# Patient Record
Sex: MALE | Race: WHITE
[De-identification: names, ages, dates, MRNs, and addresses within clinical notes are randomized per-mention and may not be internally consistent; named-entity substitution may affect disease eponyms.]

---

## 2017-03-01 ENCOUNTER — RX ONLY (RX ONLY)
Age: 47
End: 2017-03-01

## 2017-03-01 ENCOUNTER — DOCTOR'S OFFICE (OUTPATIENT)
Dept: URBAN - METROPOLITAN AREA CLINIC 125 | Facility: CLINIC | Age: 47
Setting detail: OPHTHALMOLOGY
End: 2017-03-01
Payer: OTHER GOVERNMENT

## 2017-03-01 DIAGNOSIS — H11.31: ICD-10-CM

## 2017-03-01 DIAGNOSIS — Z94.7: ICD-10-CM

## 2017-03-01 PROCEDURE — 92002 INTRM OPH EXAM NEW PATIENT: CPT | Performed by: OPTOMETRIST

## 2017-03-01 ASSESSMENT — REFRACTION_MANIFEST
OU_VA: 20/
OD_VA2: 20/
OD_VA2: 20/
OD_VA3: 20/
OS_VA1: 20/
OD_VA1: 20/
OS_VA2: 20/
OS_VA3: 20/
OS_VA1: 20/
OS_VA3: 20/
OS_VA2: 20/
OS_VA3: 20/
OD_VA1: 20/
OS_VA2: 20/
OD_VA2: 20/
OD_VA3: 20/
OU_VA: 20/
OD_VA3: 20/
OD_VA1: 20/
OS_VA1: 20/
OU_VA: 20/

## 2017-03-01 ASSESSMENT — REFRACTION_CURRENTRX
OD_OVR_VA: 20/
OS_OVR_VA: 20/
OS_OVR_VA: 20/
OD_OVR_VA: 20/
OS_OVR_VA: 20/
OD_OVR_VA: 20/

## 2017-03-01 ASSESSMENT — CONFRONTATIONAL VISUAL FIELD TEST (CVF)
OD_FINDINGS: FULL
OS_FINDINGS: FULL

## 2017-03-01 ASSESSMENT — VISUAL ACUITY
OD_BCVA: 20/50-2
OS_BCVA: 20/60-2

## 2019-03-27 ENCOUNTER — WALK IN (OUTPATIENT)
Dept: URGENT CARE | Age: 49
End: 2019-03-27

## 2019-03-27 VITALS
DIASTOLIC BLOOD PRESSURE: 70 MMHG | RESPIRATION RATE: 16 BRPM | TEMPERATURE: 98.5 F | SYSTOLIC BLOOD PRESSURE: 118 MMHG | BODY MASS INDEX: 23.4 KG/M2 | WEIGHT: 158 LBS | OXYGEN SATURATION: 97 % | HEIGHT: 69 IN | HEART RATE: 66 BPM

## 2019-03-27 DIAGNOSIS — K13.0 CELLULITIS OF LIP: Primary | ICD-10-CM

## 2019-03-27 PROCEDURE — 99202 OFFICE O/P NEW SF 15 MIN: CPT | Performed by: NURSE PRACTITIONER

## 2019-03-27 RX ORDER — AMOXICILLIN AND CLAVULANATE POTASSIUM 875; 125 MG/1; MG/1
1 TABLET, FILM COATED ORAL 2 TIMES DAILY
Qty: 20 TABLET | Refills: 0 | Status: SHIPPED | OUTPATIENT
Start: 2019-03-27

## 2019-03-27 ASSESSMENT — ENCOUNTER SYMPTOMS: FACIAL SWELLING: 1

## 2020-08-25 ENCOUNTER — HOSPITAL ENCOUNTER (EMERGENCY)
Dept: HOSPITAL 15 - ER | Age: 50
Discharge: HOME | End: 2020-08-25
Payer: SELF-PAY

## 2020-08-25 VITALS — DIASTOLIC BLOOD PRESSURE: 81 MMHG | SYSTOLIC BLOOD PRESSURE: 132 MMHG

## 2020-08-25 VITALS — WEIGHT: 137.06 LBS | HEIGHT: 72 IN | BODY MASS INDEX: 18.56 KG/M2

## 2020-08-25 DIAGNOSIS — M25.561: Primary | ICD-10-CM

## 2020-08-25 DIAGNOSIS — G89.29: ICD-10-CM

## 2020-08-25 DIAGNOSIS — F17.210: ICD-10-CM

## 2020-08-25 PROCEDURE — 73562 X-RAY EXAM OF KNEE 3: CPT

## 2024-08-27 ENCOUNTER — HOSPITAL ENCOUNTER (INPATIENT)
Facility: CLINIC | Age: 54
LOS: 1 days | Discharge: ANOTHER HOSPITAL | End: 2024-08-28
Attending: HOSPITALIST | Admitting: STUDENT IN AN ORGANIZED HEALTH CARE EDUCATION/TRAINING PROGRAM
Payer: COMMERCIAL

## 2024-08-27 PROBLEM — K85.90 PANCREATITIS: Status: ACTIVE | Noted: 2024-08-27

## 2024-08-27 PROCEDURE — 250N000011 HC RX IP 250 OP 636: Performed by: STUDENT IN AN ORGANIZED HEALTH CARE EDUCATION/TRAINING PROGRAM

## 2024-08-27 PROCEDURE — 83605 ASSAY OF LACTIC ACID: CPT | Performed by: STUDENT IN AN ORGANIZED HEALTH CARE EDUCATION/TRAINING PROGRAM

## 2024-08-27 PROCEDURE — 99223 1ST HOSP IP/OBS HIGH 75: CPT | Performed by: STUDENT IN AN ORGANIZED HEALTH CARE EDUCATION/TRAINING PROGRAM

## 2024-08-27 PROCEDURE — 36415 COLL VENOUS BLD VENIPUNCTURE: CPT | Performed by: STUDENT IN AN ORGANIZED HEALTH CARE EDUCATION/TRAINING PROGRAM

## 2024-08-27 PROCEDURE — 84484 ASSAY OF TROPONIN QUANT: CPT | Performed by: STUDENT IN AN ORGANIZED HEALTH CARE EDUCATION/TRAINING PROGRAM

## 2024-08-27 PROCEDURE — 258N000003 HC RX IP 258 OP 636: Performed by: STUDENT IN AN ORGANIZED HEALTH CARE EDUCATION/TRAINING PROGRAM

## 2024-08-27 PROCEDURE — 120N000013 HC R&B IMCU

## 2024-08-27 RX ORDER — AMOXICILLIN 250 MG
2 CAPSULE ORAL 2 TIMES DAILY
Status: DISCONTINUED | OUTPATIENT
Start: 2024-08-27 | End: 2024-08-28 | Stop reason: HOSPADM

## 2024-08-27 RX ORDER — HYDRALAZINE HYDROCHLORIDE 10 MG/1
10 TABLET, FILM COATED ORAL EVERY 4 HOURS PRN
Status: DISCONTINUED | OUTPATIENT
Start: 2024-08-27 | End: 2024-08-28 | Stop reason: HOSPADM

## 2024-08-27 RX ORDER — ACETAMINOPHEN 325 MG/1
975 TABLET ORAL 3 TIMES DAILY
Status: DISCONTINUED | OUTPATIENT
Start: 2024-08-27 | End: 2024-08-28 | Stop reason: HOSPADM

## 2024-08-27 RX ORDER — SODIUM CHLORIDE 9 MG/ML
INJECTION, SOLUTION INTRAVENOUS CONTINUOUS
Status: DISCONTINUED | OUTPATIENT
Start: 2024-08-27 | End: 2024-08-28 | Stop reason: HOSPADM

## 2024-08-27 RX ORDER — NALOXONE HYDROCHLORIDE 0.4 MG/ML
0.4 INJECTION, SOLUTION INTRAMUSCULAR; INTRAVENOUS; SUBCUTANEOUS
Status: DISCONTINUED | OUTPATIENT
Start: 2024-08-27 | End: 2024-08-28 | Stop reason: HOSPADM

## 2024-08-27 RX ORDER — CELECOXIB 200 MG/1
200 CAPSULE ORAL 2 TIMES DAILY
Status: DISCONTINUED | OUTPATIENT
Start: 2024-08-27 | End: 2024-08-28 | Stop reason: HOSPADM

## 2024-08-27 RX ORDER — ONDANSETRON 4 MG/1
4 TABLET, ORALLY DISINTEGRATING ORAL EVERY 6 HOURS PRN
Status: DISCONTINUED | OUTPATIENT
Start: 2024-08-27 | End: 2024-08-28 | Stop reason: HOSPADM

## 2024-08-27 RX ORDER — AMOXICILLIN 250 MG
2 CAPSULE ORAL 2 TIMES DAILY PRN
Status: DISCONTINUED | OUTPATIENT
Start: 2024-08-27 | End: 2024-08-28 | Stop reason: HOSPADM

## 2024-08-27 RX ORDER — NALOXONE HYDROCHLORIDE 0.4 MG/ML
0.2 INJECTION, SOLUTION INTRAMUSCULAR; INTRAVENOUS; SUBCUTANEOUS
Status: DISCONTINUED | OUTPATIENT
Start: 2024-08-27 | End: 2024-08-28 | Stop reason: HOSPADM

## 2024-08-27 RX ORDER — AMOXICILLIN 250 MG
1 CAPSULE ORAL 2 TIMES DAILY
Status: DISCONTINUED | OUTPATIENT
Start: 2024-08-27 | End: 2024-08-28 | Stop reason: HOSPADM

## 2024-08-27 RX ORDER — CALCIUM CARBONATE 500 MG/1
1000 TABLET, CHEWABLE ORAL 4 TIMES DAILY PRN
Status: DISCONTINUED | OUTPATIENT
Start: 2024-08-27 | End: 2024-08-28 | Stop reason: HOSPADM

## 2024-08-27 RX ORDER — PROCHLORPERAZINE MALEATE 10 MG
10 TABLET ORAL EVERY 6 HOURS PRN
Status: DISCONTINUED | OUTPATIENT
Start: 2024-08-27 | End: 2024-08-28 | Stop reason: HOSPADM

## 2024-08-27 RX ORDER — POLYETHYLENE GLYCOL 3350 17 G/17G
17 POWDER, FOR SOLUTION ORAL DAILY
Status: DISCONTINUED | OUTPATIENT
Start: 2024-08-28 | End: 2024-08-28 | Stop reason: HOSPADM

## 2024-08-27 RX ORDER — LIDOCAINE 40 MG/G
CREAM TOPICAL
Status: DISCONTINUED | OUTPATIENT
Start: 2024-08-27 | End: 2024-08-28 | Stop reason: HOSPADM

## 2024-08-27 RX ORDER — NICOTINE 21 MG/24HR
1 PATCH, TRANSDERMAL 24 HOURS TRANSDERMAL AT BEDTIME
Status: DISCONTINUED | OUTPATIENT
Start: 2024-08-28 | End: 2024-08-28 | Stop reason: HOSPADM

## 2024-08-27 RX ORDER — PROCHLORPERAZINE 25 MG
25 SUPPOSITORY, RECTAL RECTAL EVERY 12 HOURS PRN
Status: DISCONTINUED | OUTPATIENT
Start: 2024-08-27 | End: 2024-08-28 | Stop reason: HOSPADM

## 2024-08-27 RX ORDER — SODIUM CHLORIDE 9 MG/ML
INJECTION, SOLUTION INTRAVENOUS CONTINUOUS
Status: DISCONTINUED | OUTPATIENT
Start: 2024-08-27 | End: 2024-08-28

## 2024-08-27 RX ORDER — LIDOCAINE 40 MG/G
CREAM TOPICAL
Status: DISCONTINUED | OUTPATIENT
Start: 2024-08-27 | End: 2024-08-27

## 2024-08-27 RX ORDER — HYDRALAZINE HYDROCHLORIDE 20 MG/ML
10 INJECTION INTRAMUSCULAR; INTRAVENOUS EVERY 4 HOURS PRN
Status: DISCONTINUED | OUTPATIENT
Start: 2024-08-27 | End: 2024-08-28 | Stop reason: HOSPADM

## 2024-08-27 RX ORDER — ONDANSETRON 2 MG/ML
4 INJECTION INTRAMUSCULAR; INTRAVENOUS EVERY 6 HOURS PRN
Status: DISCONTINUED | OUTPATIENT
Start: 2024-08-27 | End: 2024-08-28 | Stop reason: HOSPADM

## 2024-08-27 RX ORDER — AMOXICILLIN 250 MG
1 CAPSULE ORAL 2 TIMES DAILY PRN
Status: DISCONTINUED | OUTPATIENT
Start: 2024-08-27 | End: 2024-08-28 | Stop reason: HOSPADM

## 2024-08-27 RX ADMIN — Medication: at 23:56

## 2024-08-27 RX ADMIN — SODIUM CHLORIDE: 9 INJECTION, SOLUTION INTRAVENOUS at 23:38

## 2024-08-27 ASSESSMENT — COLUMBIA-SUICIDE SEVERITY RATING SCALE - C-SSRS
6. HAVE YOU EVER DONE ANYTHING, STARTED TO DO ANYTHING, OR PREPARED TO DO ANYTHING TO END YOUR LIFE?: NO
1. IN THE PAST MONTH, HAVE YOU WISHED YOU WERE DEAD OR WISHED YOU COULD GO TO SLEEP AND NOT WAKE UP?: NO
2. HAVE YOU ACTUALLY HAD ANY THOUGHTS OF KILLING YOURSELF IN THE PAST MONTH?: NO
6. HAVE YOU EVER DONE ANYTHING, STARTED TO DO ANYTHING, OR PREPARED TO DO ANYTHING TO END YOUR LIFE?: NO
2. HAVE YOU ACTUALLY HAD ANY THOUGHTS OF KILLING YOURSELF SINCE LAST CONTACT?: NO

## 2024-08-27 ASSESSMENT — ACTIVITIES OF DAILY LIVING (ADL)
ADLS_ACUITY_SCORE: 20
WEAR_GLASSES_OR_BLIND: YES
CHANGE_IN_FUNCTIONAL_STATUS_SINCE_ONSET_OF_CURRENT_ILLNESS/INJURY: NO

## 2024-08-28 ENCOUNTER — HOSPITAL ENCOUNTER (OUTPATIENT)
Age: 54
End: 2024-08-28
Attending: STUDENT IN AN ORGANIZED HEALTH CARE EDUCATION/TRAINING PROGRAM | Admitting: STUDENT IN AN ORGANIZED HEALTH CARE EDUCATION/TRAINING PROGRAM
Payer: COMMERCIAL

## 2024-08-28 VITALS
RESPIRATION RATE: 12 BRPM | HEART RATE: 69 BPM | TEMPERATURE: 97.8 F | BODY MASS INDEX: 23.05 KG/M2 | OXYGEN SATURATION: 94 % | WEIGHT: 152.12 LBS | DIASTOLIC BLOOD PRESSURE: 106 MMHG | SYSTOLIC BLOOD PRESSURE: 170 MMHG | HEIGHT: 68 IN

## 2024-08-28 LAB
ALBUMIN SERPL BCG-MCNC: 4 G/DL (ref 3.5–5.2)
ALP SERPL-CCNC: 96 U/L (ref 40–150)
ALT SERPL W P-5'-P-CCNC: 12 U/L (ref 0–70)
ANION GAP SERPL CALCULATED.3IONS-SCNC: 13 MMOL/L (ref 7–15)
AST SERPL W P-5'-P-CCNC: 15 U/L (ref 0–45)
ATRIAL RATE - MUSE: 73 BPM
BILIRUB SERPL-MCNC: 0.3 MG/DL
BUN SERPL-MCNC: 15.2 MG/DL (ref 6–20)
CALCIUM SERPL-MCNC: 9.5 MG/DL (ref 8.8–10.4)
CHLORIDE SERPL-SCNC: 100 MMOL/L (ref 98–107)
CREAT SERPL-MCNC: 0.98 MG/DL (ref 0.67–1.17)
DIASTOLIC BLOOD PRESSURE - MUSE: NORMAL MMHG
EGFRCR SERPLBLD CKD-EPI 2021: >90 ML/MIN/1.73M2
ERYTHROCYTE [DISTWIDTH] IN BLOOD BY AUTOMATED COUNT: 12.1 % (ref 10–15)
GLUCOSE BLDC GLUCOMTR-MCNC: 110 MG/DL (ref 70–99)
GLUCOSE SERPL-MCNC: 100 MG/DL (ref 70–99)
HCO3 SERPL-SCNC: 25 MMOL/L (ref 22–29)
HCT VFR BLD AUTO: 39.8 % (ref 40–53)
HGB BLD-MCNC: 13.2 G/DL (ref 13.3–17.7)
INTERPRETATION ECG - MUSE: NORMAL
LACTATE SERPL-SCNC: 0.8 MMOL/L (ref 0.7–2)
LACTATE SERPL-SCNC: 1.5 MMOL/L (ref 0.7–2)
MCH RBC QN AUTO: 30.2 PG (ref 26.5–33)
MCHC RBC AUTO-ENTMCNC: 33.2 G/DL (ref 31.5–36.5)
MCV RBC AUTO: 91 FL (ref 78–100)
P AXIS - MUSE: 117 DEGREES
PLATELET # BLD AUTO: 374 10E3/UL (ref 150–450)
POTASSIUM SERPL-SCNC: 4.1 MMOL/L (ref 3.4–5.3)
PR INTERVAL - MUSE: 146 MS
PROT SERPL-MCNC: 7.1 G/DL (ref 6.4–8.3)
PTH-INTACT SERPL-MCNC: 41 PG/ML (ref 15–65)
QRS DURATION - MUSE: 86 MS
QT - MUSE: 398 MS
QTC - MUSE: 438 MS
R AXIS - MUSE: 89 DEGREES
RBC # BLD AUTO: 4.37 10E6/UL (ref 4.4–5.9)
SODIUM SERPL-SCNC: 138 MMOL/L (ref 135–145)
SYSTOLIC BLOOD PRESSURE - MUSE: NORMAL MMHG
T AXIS - MUSE: 161 DEGREES
TROPONIN T SERPL HS-MCNC: 8 NG/L
VENTRICULAR RATE- MUSE: 73 BPM
WBC # BLD AUTO: 14.2 10E3/UL (ref 4–11)

## 2024-08-28 PROCEDURE — 36415 COLL VENOUS BLD VENIPUNCTURE: CPT | Performed by: STUDENT IN AN ORGANIZED HEALTH CARE EDUCATION/TRAINING PROGRAM

## 2024-08-28 PROCEDURE — 83605 ASSAY OF LACTIC ACID: CPT | Performed by: STUDENT IN AN ORGANIZED HEALTH CARE EDUCATION/TRAINING PROGRAM

## 2024-08-28 PROCEDURE — 250N000013 HC RX MED GY IP 250 OP 250 PS 637: Performed by: INTERNAL MEDICINE

## 2024-08-28 PROCEDURE — 250N000011 HC RX IP 250 OP 636: Performed by: STUDENT IN AN ORGANIZED HEALTH CARE EDUCATION/TRAINING PROGRAM

## 2024-08-28 PROCEDURE — 93005 ELECTROCARDIOGRAM TRACING: CPT

## 2024-08-28 PROCEDURE — 258N000003 HC RX IP 258 OP 636: Performed by: STUDENT IN AN ORGANIZED HEALTH CARE EDUCATION/TRAINING PROGRAM

## 2024-08-28 PROCEDURE — 93010 ELECTROCARDIOGRAM REPORT: CPT | Performed by: INTERNAL MEDICINE

## 2024-08-28 PROCEDURE — 85027 COMPLETE CBC AUTOMATED: CPT | Performed by: STUDENT IN AN ORGANIZED HEALTH CARE EDUCATION/TRAINING PROGRAM

## 2024-08-28 PROCEDURE — 83970 ASSAY OF PARATHORMONE: CPT | Performed by: STUDENT IN AN ORGANIZED HEALTH CARE EDUCATION/TRAINING PROGRAM

## 2024-08-28 PROCEDURE — 250N000011 HC RX IP 250 OP 636: Performed by: INTERNAL MEDICINE

## 2024-08-28 PROCEDURE — 80053 COMPREHEN METABOLIC PANEL: CPT | Performed by: STUDENT IN AN ORGANIZED HEALTH CARE EDUCATION/TRAINING PROGRAM

## 2024-08-28 PROCEDURE — 999N000127 HC STATISTIC PERIPHERAL IV START W US GUIDANCE

## 2024-08-28 PROCEDURE — 99232 SBSQ HOSP IP/OBS MODERATE 35: CPT | Performed by: INTERNAL MEDICINE

## 2024-08-28 PROCEDURE — 250N000013 HC RX MED GY IP 250 OP 250 PS 637: Performed by: STUDENT IN AN ORGANIZED HEALTH CARE EDUCATION/TRAINING PROGRAM

## 2024-08-28 RX ORDER — OXYCODONE HYDROCHLORIDE 5 MG/1
10 TABLET ORAL ONCE
Status: COMPLETED | OUTPATIENT
Start: 2024-08-28 | End: 2024-08-28

## 2024-08-28 RX ADMIN — CELECOXIB 200 MG: 200 CAPSULE ORAL at 00:32

## 2024-08-28 RX ADMIN — SODIUM CHLORIDE: 9 INJECTION, SOLUTION INTRAVENOUS at 06:04

## 2024-08-28 RX ADMIN — SODIUM CHLORIDE: 9 INJECTION, SOLUTION INTRAVENOUS at 13:21

## 2024-08-28 RX ADMIN — SODIUM CHLORIDE: 9 INJECTION, SOLUTION INTRAVENOUS at 13:30

## 2024-08-28 RX ADMIN — SENNOSIDES AND DOCUSATE SODIUM 1 TABLET: 50; 8.6 TABLET ORAL at 00:32

## 2024-08-28 RX ADMIN — ONDANSETRON 4 MG: 4 TABLET, ORALLY DISINTEGRATING ORAL at 16:29

## 2024-08-28 RX ADMIN — OXYCODONE HYDROCHLORIDE 10 MG: 5 TABLET ORAL at 19:10

## 2024-08-28 RX ADMIN — ONDANSETRON 4 MG: 4 TABLET, ORALLY DISINTEGRATING ORAL at 09:11

## 2024-08-28 RX ADMIN — Medication: at 13:20

## 2024-08-28 ASSESSMENT — ACTIVITIES OF DAILY LIVING (ADL)
ADLS_ACUITY_SCORE: 24
ADLS_ACUITY_SCORE: 24
ADLS_ACUITY_SCORE: 20
ADLS_ACUITY_SCORE: 24
ADLS_ACUITY_SCORE: 20
ADLS_ACUITY_SCORE: 22
ADLS_ACUITY_SCORE: 20
ADLS_ACUITY_SCORE: 24
ADLS_ACUITY_SCORE: 24
ADLS_ACUITY_SCORE: 20
ADLS_ACUITY_SCORE: 24
ADLS_ACUITY_SCORE: 20
ADLS_ACUITY_SCORE: 24
ADLS_ACUITY_SCORE: 22
ADLS_ACUITY_SCORE: 21
ADLS_ACUITY_SCORE: 21
ADLS_ACUITY_SCORE: 24
ADLS_ACUITY_SCORE: 20
ADLS_ACUITY_SCORE: 20

## 2024-08-28 NOTE — CONSULTS
Discussed this very complicated patient with the vascular fellow.  This patient is very high risk for a bleed into his pancreatic pseudocyst.  That is not something that I think can be managed at this hospital.  He should be someplace with experience hepatobiliary surgeons in case surgery should become necessary.  Likely will be able to be managed with IR but again has high risk of decompensation.  Full consult will not be done as there is likely going to be transferred to a different facility.

## 2024-08-28 NOTE — PLAN OF CARE
Goal Outcome Evaluation:  Patient Name: Suzie  MRN: [id number]  Date of Admission: 8/27/2024  Reason for Admission: Pancreatitis  Level of Care: [facility] [place]    Vitals:   BP Readings from Last 1 Encounters:   08/28/24 (!) 157/102     Pulse Readings from Last 1 Encounters:   08/28/24 62     Wt Readings from Last 1 Encounters:   No data found for Wt     Ht Readings from Last 1 Encounters:   No data found for Ht     There is no height or weight on file to calculate BMI.  Temp Readings from Last 1 Encounters:   08/28/24 98.4  F (36.9  C) (Oral)       Pain: Pain goal 0 Pain Rating pain bet 2-6 Effective pain medication/regimen. PCA pump     CV Surgery Patient: No    Assessment    Resp: VSS on RA.except HTN. Ls clr  Telemetry: NSR  Neuro: A/Ox4.CMS intact.   GI/: voiding w/o difficulty. BS+,flatus+. Denies n/v. NPO  Skin/Wounds: intact.   Lines/Drains: PIV x1  infusing.   Activity: up SBA.  Sleep: Fair    Aggression Stop Light: Green          Patient Care Plan: NPO. IR,  Vascular sugary,and GI consult.

## 2024-08-28 NOTE — CARE PLAN
Transfer Type: [facility]  Transfer Triage Note    Date of call: 08/28/24  Time of call: 11:19 AM    Current Patient Location:  [facility]  Current Level of Care: [facility] [place]    Vitals: Temp: 97.6  F (36.4  C) Temp src: Oral BP: (!) 160/97 Pulse: 57   Resp: 11 SpO2: 96 %      O2 Device: None (Room air) at    Diagnosis: Necrotizing pancreatitis, Celiac artery vasculitis  Reason for requested transfer: Further diagnostic work up, management, and consultation for specialized care   Isolation Needs: None    Care everywhere has been updated and reviewed: Yes  Necessary images have been sent through PACS: Yes    If patient is transferring for specialty care or specific procedure, the specialist required has participated in the transfer call and agreed with need for transfer and anticipated timeline: No    Transfer accepted: Yes  Stability of Patient: Patient is vitally stable, with no critical labs, and will likely remain stable throughout the transfer process  Is the patient appropriate for [facility]? No, What specific [place] needs are anticipated? Hepatobiliary, panc bili, rheumatology, vascular surgery, IR  Level of Care Needed: Med Surg  Telemetry Needed:  None  Expected Time of Arrival for Transfer: greater than 24 hours  Arrival Location:  [facility]     Recommendations for Management and Stabilization: Not needed    Additional Comments:     55 yo M with initially at [place] who was admitted with pancreatitis and small area of pancreatic necrosis. 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 There is concern on imaging for celiac artery vasculitis. Locally general surgery, IR, vascular surgery, and GI have been consulted and believe beyond capabilities of [facility]. They do not believe mechanical intervention on artery dissection and AVM is warranted given risk  for collaeral damage to surrounding vasculature.    Locally there is concern for rheumatologic involvement and auto immune vasculitis causing patients presentation. In addition, patient was evaluated by local surgery team who felt beyond capabilities of their facility to manage.    Plan to accept to [facility] for multi speciality consultation for possible vasculitis with rheumatology consult, ongoing pancreatitis management with panc-bili GI, general surgery, and pseudoansuersym/AVM with vascular surgery and IR.     No beds likely available at this time and may be multiple days before transfer to [facility].     Waylon Champion MD

## 2024-08-28 NOTE — PLAN OF CARE
"Goal Outcome Evaluation:    0700-1930    Patient Name: Suzie  MRN: [id number]  Date of Admission: 8/27/2024  Reason for Admission: Necrotizing pancreatitis  Level of Care: [facility] [place]    Vitals:   BP Readings from Last 1 Encounters:   08/28/24 (!) 173/104     Pulse Readings from Last 1 Encounters:   08/28/24 75     Wt Readings from Last 1 Encounters:   08/28/24 69 kg (152 lb 1.9 oz)     Ht Readings from Last 1 Encounters:   08/28/24 1.727 m (5' 8\")     There is no height or weight on file to calculate BMI.  Temp Readings from Last 1 Encounters:   08/28/24 97.6  F (36.4  C) (Oral)       Pain: Pain goal 0 Pain Rating 6 Effective pain medication/regimen Dilaudid PCA.    CV Surgery Patient: No    Assessment    Vitals: Hypertensive otherwise VSS  Resp: RA. LS are dim.   Telemetry: SR  Neuro: A/O x4  GI/: Adequate UOP. No BM during shift, passing flatus, BS +  Skin/Wounds: Intact  Lines/Drains: R PIV infusing NS @ 125 ml/hr. L PIV PCA infusion stopped for transfer to outside facility.  Activity: SBA, ambulating to restroom  Diet: NPO except ice chips  Abnormal Labs: WBC 14.2    Aggression Stop Light: Green          Patient Care Plan: Awaiting transport to [facility], handoff given to NOLAN Edwards at 1715.    At 1920 pt left floor via EMS stretcher transferring to [facility]. Pt left floor w/ all home belongings.    "

## 2024-08-28 NOTE — CONSULTS
Received a consult regarding Vincent Byers who is a 54 year old male with a recent history of pancreatitis, abdominal pain admitted with increased abdominal pain. He had a CTA chest, abdomen, pelvis at [facility] which had a questionable pancreatic AVC, celiac pseudoaneurysm and a pancreatic fluid collection. Request for IR to review and possibly treat was made.     Reviewed imaging and chart with IR Dr Tello and Dr Oswald.   -The pancreatic fluid collection is too deep and small. Would not recommend treating with a percutaneous drain.   -The pancreatic AVM is actually a vascular malformation around the head and body of the pancreas, possible caused by the pancreatic inflammation.   -There is also SMA wall and R renal artery wall thickening which appears to be a vasculitis  -The area that appears to be a pseudoaneurysm is actually a dissection flap in the celiac trunk and IR recommends just watching for now.     Recommend a work up for vasculitis, checking inflammatory markers and/or a vascular medicine or a rheumatology consult which understood is difficult to get in the hospital.     No IR intervention at this time. Possibly GI recommendations.      Discussed above with Hospitalist Dr Chavez today.     Total time: 25 minutes     Thanks, [facility] Interventional Radiology CNP ([phone]) (phone [phone])

## 2024-08-28 NOTE — H&P
"Assessment / Plan    54M no PMH presenting w/ progressive abdominal pain after recent hospitalisation for pancreatitis.     Acute noninfected necrotizing Pancreaitits  Hypercalcemia  Lactic Acidsosis -  Resolved  ``Hx: Patient presenting with Upper abdominal pain for atleast 2 weeks, that is progressive&constant in nature, associated w/ nausea/vomiting/bloating, radiates to the L chest making it difficult to take deep breaths, and aggrevated w/ food intake. No fevers/chills, does endorse constipation. No ETOH use, no Hx gallstones.  Patient reports visiting [facility] ER twice, on 8/14 and 8/15 for the pain. Patient presented to [facility] ER 8/18 and was admitted.  Underwent MRCP which identified pancreatitis with a suspected small area of developing necrosis w/o evidence of gallstones. TG were negative,  IgG negative. GI recommended Outpatient EUS for the pancreatic attentuation. Since his discharge, pain progressed in nature, and today was most severe after consuming prune juice.   ``Vitals/Exam: SBP 180s  ``EKG: NSR w/ diffuse q waves and high lateral TWIs  ``Labs:Amylase 390s. CRP 6.6, Ddimer 0.8, Ca 11.4, WBC 13.2, AST 29, negative Trop  ``Imaging: CT negative for PE. \"Interval progression in seize of the low-attenuation fluid collection seen within the pancreatic body, with new mild peripancreatic inflammatory changes. Likely related to acute necrotizing pancreatitis w/ progression of the acute necrotic collection\"  ``ER Course: percocet, dilaudid, 1L fluids  --[facility] consult  --NPO (consider liquid diet in AM), High rate IVF  --standing tylenol, celebrex  --PCA pump w/ Bowel regimen  --PTH level  --trend lactate, wbc  --IR consult  --Repeat EKG/Trop since persistant radiation of pain to L chest    ?Celiac artery pseudoaneurysm  ?Pancreatic AVM  ``CT read \"interval development of an abnormal appearance of the proximal celiac artery, with a contour abnormality seen along the right lateral wall. There are mild " "associated surrounding inflammatory changes in this region. Given the presence of pancreatitis, could be related to celiac artery pseudoaneurysm. Also an abnormal collection of vessels along the anterior margin of the pancreatic neck and body which could be an AVM\"  --IR consult  --Vascular surgery consult  --Given increased of DVTs given patient's acute sickness, it maybe warranted to started ppx lovenox, however would defer to IR    HTN  ``likely pain-induced  --prn hydralazine    Nicotine Use  ``half a pack for 30yrs=15 pack yrs  --outpatient Low dose CT yearly  --nicotine protocol    Supportive Care  DVT ppx: SCDs  Diet: NPO  Pain Control: tylenol, PCA  Upper GI ppx: zofran  Lower GI ppx: senna  Lines/Catheters: IV  TTE/Dopplers: None  Contact/Seizure/Fall/Delerium Precautions: None  PT/OT/Social/Wound/Palliative Consults: None  Code Status: Full Code     History of Present Illness  Patient presenting with Upper abdominal pain for atleast 2 weeks, that is progressive&constant in nature, associated w/ nausea/vomiting/bloating, radiates to the L chest making it difficult to take deep breaths, and aggrevated w/ food intake. No fevers/chills, does endorse constipation. No ETOH use, no Hx gallstones.  Patient reports visiting [facility] ER twice, on 8/14 and 8/15 for the pain. Patient presented to [facility] ER 8/18 and was admitted.  Underwent MRCP which identified pancreatitis with a suspected small area of developing necrosis w/o evidence of gallstones. TG were negative,  IgG negative. GI recommended Outpatient EUS for the pancreatic attentuation. Since his discharge, pain progressed in nature, and today was most severe after consuming prune juice.     ROS: 10 point ROS neg other than the symptoms noted above in the HPI.     Objective History  BP (!) 178/109   Pulse 62   Resp 12   SpO2 94%     Constitutional: Alert and oriented to person, place and time; no apparent distress.   HEENT: Normocephalic, no scleral " icterus  Abdomen: soft, no distention/guarding, Epigastric and LUQ tenderness  Lungs: lungs clear to auscultation bilaterally  Heart: Regular s1s2, no evidence of murmurs  Neuro:No focal strength/sensation deficits  Skin/Extremities: No obvious signs of skin breakdown, no LE edema  Psychiatric: appropriate affect, insight and judgment  Back: No midline tenderness, no CVA tenderness    I have personally spent 83 minutes total time today in preparing to see the patient (eg, review of tests), obtaining and/or reviewing separately obtained history, performing a medically appropriate examination and/or evaluation, counseling and educating the patient/family/caregiver, ordering medications, tests, or procedures, referring and communicating with other health care professionals, documenting clinical information in the electronic or other health record, independently interpreting results and communicating results to the patient/ family/caregiver and care coordination.

## 2024-08-28 NOTE — PHARMACY-ADMISSION MEDICATION HISTORY
Pharmacist Admission Medication History    Admission medication history is complete. The information provided in this note is only as accurate as the sources available at the time of the update.    Information Source(s): Patient, Hospital records, and CareEverywhere/SureScripts via in-person    Pertinent Information: None    Changes made to PTA medication list:  Added: None  Deleted: None  Changed: None      Medication History Completed By: Mario Martinez RPH 8/28/2024 9:20 AM    No outpatient medications have been marked as taking for the 8/27/24 encounter (Hospital Encounter).

## 2024-08-28 NOTE — DISCHARGE SUMMARY
[facility]  Hospitalist Discharge Summary      Date of Admission:  8/27/2024  Date of Discharge:  8/28/2024  Discharging Provider: Peña Chavez MD  Discharge Service: Hospitalist Service    Discharge Diagnoses   Acute noninfected necrotizing Pancreaitits  Hypercalcemia  Lactic Acidsosis -  Resolved      Celiac artery pseudoaneurysm  Pancreatic AVM      HTN      Nicotine Use        Clinically Significant Risk Factors          Follow-ups Needed After Discharge   Follow-up Appointments     Follow Up and recommended labs and tests      Follow up with hepatobiliary team at [facility]        {Additional follow-up instructions/to-do's for Hepatobiliary team at [facility]    Unresulted Labs Ordered in the Past 30 Days of this Admission       No orders found for last 31 day(s).        These results will be followed up by [facility] and hepatogiliary and IR team at [facility]    Discharge Disposition   Transferred to [facility]  Condition at discharge: Guarded    Hospital Course   54M no PMH presenting w/ progressive abdominal pain after recent hospitalisation for pancreatitis.      Acute noninfected necrotizing Pancreaitits  Hypercalcemia  Lactic Acidsosis -  Resolved  ``Hx: Patient presenting with Upper abdominal pain for atleast 2 weeks, that is progressive&constant in nature, associated w/ nausea/vomiting/bloating, radiates to the L chest making it difficult to take deep breaths, and aggrevated w/ food intake. No fevers/chills, does endorse constipation. No ETOH use, no Hx gallstones.  Patient reports visiting [facility] ER twice, on 8/14 and 8/15 for the pain. Patient presented to [facility] ER 8/18 and was admitted.  Underwent MRCP which identified pancreatitis with a suspected small area of developing necrosis w/o evidence of gallstones. TG were negative,  IgG negative. GI recommended Outpatient EUS for the pancreatic attentuation. Since his discharge, pain progressed in nature, and today was most  "severe after consuming prune juice.   ``Vitals/Exam: SBP 180s  ``EKG: NSR w/ diffuse q waves and high lateral TWIs  ``Labs:Amylase 390s. CRP 6.6, Ddimer 0.8, Ca 11.4, WBC 13.2, AST 29, negative Trop  ``Imaging: CT negative for PE. \"Interval progression in seize of the low-attenuation fluid collection seen within the pancreatic body, with new mild peripancreatic inflammatory changes. Likely related to acute necrotizing pancreatitis w/ progression of the acute necrotic collection\"  ``ER Course: percocet, dilaudid, 1L fluids  --[facility] consulted and recommendation of further management with hepatobiliary team at [facility]  --NPO (consider liquid diet in AM), High rate IVF  --standing tylenol, celebrex  --PCA pump w/ Bowel regimen  --PTH level pending  --wbc elevated, lactic acid has normalized  --IR consult at [facility], they did not recommend:    Reviewed imaging and chart with IR Dr Tello and Dr Oswald.   -The pancreatic fluid collection is too deep and small. Would not recommend treating with a percutaneous drain.   -The pancreatic AVM is actually a vascular malformation around the head and body of the pancreas, possible caused by the pancreatic inflammation.   -There is also SMA wall and R renal artery wall thickening which appears to be a vasculitis  -The area that appears to be a pseudoaneurysm is actually a dissection flap in the celiac trunk and IR recommends just watching for now.      Recommend a work up for vasculitis, checking inflammatory markers and/or a vascular medicine or a rheumatology consult which understood is difficult to get in the hospital.      No IR intervention at this time. Possibly GI recommendations.          ## Celiac artery pseudoaneurysm  ## Pancreatic AVM  ``CT read \"interval development of an abnormal appearance of the proximal celiac artery, with a contour abnormality seen along the right lateral wall. There are mild associated surrounding inflammatory changes in this region. Given the " "presence of pancreatitis, could be related to celiac artery pseudoaneurysm. Also an abnormal collection of vessels along the anterior margin of the pancreatic neck and body which could be an AVM\"  --Vascular surgery consult as follows:   Discussed this very complicated patient with the vascular fellow.  This patient is very high risk for a bleed into his pancreatic pseudocyst.  That is not something that I think can be managed at this hospital.  He should be someplace with experience hepatobiliary surgeons in case surgery should become necessary.  Likely will be able to be managed with IR but again has high risk of decompensation.  Full consult will not be done as there is likely going to be transferred to a different facility.        HTN  ``likely pain-induced  --prn hydralazine     Nicotine Use  ``half a pack for 30yrs=15 pack yrs  --outpatient Low dose CT yearly  --nicotine protocol     Supportive Care  DVT ppx: SCDs  Diet: NPO  Pain Control: tylenol, PCA  Upper GI ppx: zofran  Lower GI ppx: senna  Lines/Catheters: IV  TTE/Dopplers: None  Contact/Seizure/Fall/Delerium Precautions: None  PT/OT/Social/Wound/Palliative Consults: None  Code Status: Full Code     CTA from 8/27 at outside hospital:     CT PULMONARY ANGIOGRAM:   Pulmonary arteries: Good contrast opacification. No filling defects to suggest   an acute pulmonary embolus.   Heart and aorta: Heart size is within normal limits. No pericardial effusion.   Normal caliber of the thoracic aorta.   Lymphadenopathy: None by size criteria.   Lungs: No evidence of acute airways or airspace disease. No pleural effusion or   pneumothorax.   Bones: No acute abnormality. Postsurgical changes left shoulder.   Other: A small amount of fluid is seen in the nondistended thoracic esophagus.   This is likely related to reflux.     CT OF THE ABDOMEN AND PELVIS:   Liver: Normal size. Small focus of fatty infiltration is seen along the   falciform ligament.   Gallbladder and " "bile ducts: No calcified stones in the gallbladder or   significant bile duct dilatation.   Spleen: Normal size. Benign calcification.   Pancreas: In the body of the pancreas on axial images 43 through 52, there is   now a more well-defined low-attenuation focus (average Hounsfield units between   15 and 20) which on axial image 49 measures 1.7 x 3.9 cm. At a similar level on   the most recent examination, this measured 0.6 x 1.8 cm. No gas is seen within   this collection. There is only minimal associated peripancreatic fat stranding   in this region.   Adrenal glands: No nodule or mass.   Kidneys: Normal size and enhancement. Right renal cyst is redemonstrated.   GI tract: Small hiatal hernia. Stomach is nondistended. No abnormally dilated   small or large bowel to suggest obstruction. No evidence of acute appendicitis.   Moderate to large amount of stool is seen in the colon.   Vessels: Atherosclerotic changes. No abdominal aortic aneurysm. There is focal   dilatation of the proximal celiac axis, best seen on axial images 44 and 45,   with associated minor periarterial edema. On the arterial phase images (from the   chest CT) on axial images 94 and 95, there is irregularity of the right lateral   wall of the celiac artery and this region. This could be related to a   pseudoaneurysm. Also best noted on the arterial phase images, is an abnormal   \"tangle\" of vessels along the anterior aspect of the pancreatic neck and body.   This could be related to an AVM.   Free air and free fluid: None.   Lymphadenopathy: None by size criteria.   Pelvic contents: No appreciable urinary bladder wall thickening. Prostate size   is within normal limits.   Bones: No acute abnormality.   Other: Tiny fat-containing umbilical hernia.     IMPRESSION:   1. No CT evidence of an acute pulmonary embolus.   2.  There has been interval progression in the size of the low-attenuation fluid   collection seen within the pancreatic body, with new " mild peripancreatic   inflammatory changes. This is likely related to acute necrotizing pancreatitis   with progression of the acute necrotic collection.   3.  There has been interval development of an abnormal appearance of the   proximal celiac artery, with a contour abnormality seen along the right lateral   wall. There are mild associated surrounding inflammatory changes in this region.   Given the presence of pancreatitis, this could be related to a celiac artery   pseudoaneurysm. There is also an abnormal collection of vessels seen along the   anterior margin of the pancreatic neck and body, which could be related to an   AVM. Recommend IR consultation.   4.  Other findings as noted above.          Consultations This Hospital Stay   GASTROENTEROLOGY IP CONSULT  INTERVENTIONAL RADIOLOGY ADULT/PEDS IP CONSULT  INTERVENTIONAL RADIOLOGY ADULT/PEDS IP CONSULT  VASCULAR SURGERY IP CONSULT  SMOKING CESSATION PROGRAM IP CONSULT  VASCULAR ACCESS ADULT IP CONSULT  INTERVENTIONAL RADIOLOGY ADULT/PEDS IP CONSULT  HEMATOLOGY & ONCOLOGY IP CONSULT  SURGERY GENERAL IP CONSULT    Code Status   Full Code    Time Spent on this Encounter   I, Peña Chavez MD, personally saw the patient today and spent greater than 30 minutes discharging this patient.       Peña Chavez MD  [address] [place]  [facility] [address]  Phone: [phone]  ______________________________________________________________________    Physical Exam   Vital Signs: Temp: 97.6  F (36.4  C) Temp src: Oral BP: (!) 160/97 Pulse: 57   Resp: 11 SpO2: 96 % O2 Device: None (Room air)    Weight: 0 lbs 0 oz  General Appearance: Resting, not confused  Respiratory: CTA  Cardiovascular: RRR  GI: + epigastric tenderness  Skin: warm and dry         Primary Care Physician   No primary care provider on file.    Discharge Orders      Mantoux instructions    Give two-step Mantoux (PPD) Per Facility Policy Yes     Follow Up  and recommended labs and tests    Follow up with hepatobiliary team at [facility]     Reason for your hospital stay    You were admitted for necrotizing pancreatitis with pseudocyst, vasculitis of SMA and renal artery and AVM     Activity - Up with nursing assistance     Full Code     Diet    Follow this diet upon discharge: Current Diet:Orders Placed This Encounter      NPO per Anesthesia Guidelines for Procedure/Surgery Except for: Meds, Ice Chips       Significant Results and Procedures   Most Recent 3 CBC's:  Recent Labs   Lab Test 08/28/24  0600   WBC 14.2*   HGB 13.2*   MCV 91        Most Recent 3 BMP's:  Recent Labs   Lab Test 08/28/24  0600 08/28/24  0047     --    POTASSIUM 4.1  --    CHLORIDE 100  --    CO2 25  --    BUN 15.2  --    CR 0.98  --    ANIONGAP 13  --    DINORAH 9.5  --    * 110*     Most Recent 2 LFT's:  Recent Labs   Lab Test 08/28/24  0600   AST 15   ALT 12   ALKPHOS 96   BILITOTAL 0.3     Most Recent 3 INR's:No lab results found., No results found for this or any previous visit.    Discharge Medications   There are no discharge medications for this patient.    Allergies   No Known Allergies

## 2024-08-28 NOTE — CONSULTS
GASTROENTEROLOGY CONSULTATION      Vincent Byers  [address] [place] [address] [address]  [facility] [address]  54 year old male     Admission Date/Time: 8/27/2024  Primary Care Provider: No primary care provider on file.     We were asked to see the patient in consultation by Dr. Chavez for evaluation of necrotizing pancreatitis.    ASSESSMENT:    1.  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  Idiopathic at this point.  No evidence of gallstones, no alcohol use, triglycerides were negative, IgG testing at outside hospital negative.    2.  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  Overall, there is concern that if this were to increase in size or rupture, the surgical capabilities are not present at [facility].  - Patient is accepted at the [facility], but there may be multiple days of waiting, since they are full.  - I did review the patient with my colleagues at [facility] and they do feel that they have the surgical, IR and pancreatic/biliary capabilities to manage the patient.    RECOMMENDATIONS:  -Discussed with Dr. Chavez, he will pursue transfer to [facility], certainly if they are full, then first available transferred to [facility] or [facility] depending on which opens first.  -IV fluids and pain management per primary service  - Monitor CBC and CMP.  - N.p.o. for now, if pain improves tomorrow, will try clear liquids.  If prolonged n.p.o., then would place nasojejunal feeding tube for tube feeds.    Magen Whiteside MD   [facility]  [phone]      ________________________________________________________________________        HPI:  Vincent Byers is a 54 year old male who has a history of recent necrotizing pancreatitis.  He was seen at the [place] emergency department twice  on August 14 and August 15 for pain.  Ultimately, was admitted on August 18.  He was diagnosed with pancreatitis and a small area of developing necrosis.  There were no evidence of gallstones, no alcohol use, triglycerides negative, IgG testing negative.  At the time, patient had recommended outpatient EUS evaluation.  The patient was home for 6 days, but presented yesterday with acute increase in epigastric abdominal pain.  Is found to have progression in the necrosis and celiac artery pseudoaneurysm, along with pancreas AVM.  Thus he was transferred for further care.    The patient reports that yesterday his abdominal pain was 15 out of 10.  Currently at 6 out of 10 in the epigastric region.  He did have nausea and vomiting yesterday.  There is no melena or medic easier.  No fevers or weight loss.  He is not on any blood thinners.  He was taking Excedrin at home for pain management.     ROS: A comprehensive ten point review of systems was negative aside from those in mentioned in the HPI.      PAST MEDICAL HISTORY:  Recent necrotizing pancreatitis at [facility]  PAST SURGICAL HISTORY:  No past surgical history on file.  SOCIAL HISTORY:  No alcohol use  FAMILY HISTORY:  No family history of pancreatitis  ALLERGIES: No Known Allergies  MEDICATIONS:   Prior to Admission medications    Not on File     PHYSICAL EXAM:   BP (!) 160/97 (BP Location: Right arm, Patient Position: Semi-Mohamud's, Cuff Size: Adult Regular)   Pulse 57   Temp 97.6  F (36.4  C) (Oral)   Resp 11   SpO2 96%    GEN: Alert, NAD.  STAS: AT, anicteric, OP without erythema, exudate, or ulcers.    LYMPH: No LAD noted.  HRT: RRR, no MAINOR  LUNGS: CTA  ABD: +BS, moderate epi tender, non-distended, no rebound or guarding.  SKIN: No rash, jaundice   NEURO: MS intact       ADDITIONAL DATA:   I reviewed the patient's new clinical lab test results.   Recent Labs   Lab Test 08/28/24  0600   WBC 14.2*   HGB 13.2*   MCV 91        Recent Labs   Lab  Test 08/28/24  0600 08/28/24  0047     --    POTASSIUM 4.1  --    CHLORIDE 100  --    CO2 25  --    BUN 15.2  --    CR 0.98  --    ANIONGAP 13  --    DINORAH 9.5  --    * 110*     Recent Labs   Lab Test 08/28/24  0600   ALBUMIN 4.0   BILITOTAL 0.3   ALT 12   AST 15        I reviewed the patient's new imaging results.     CT abdomen and pelvis (08/18/24) acquired with 100 mL of Omnipaque 350 IV contrast.     COMPARISON:   CT abdomen and pelvis with contrast 08/14/2024.   CT abdomen and pelvis with contrast 10/26/2022.     FINDINGS:     Lower chest: Lung bases are clear. No pleural or pericardial effusions. Small   hiatal hernia.     Liver: Focal fatty change adjacent to the falciform ligament.     Spleen: Unremarkable.     Pancreas: Ill-defined low attenuation in the body of the pancreas axial image   37 of series 2 appears increased. No distal pancreatic atrophy or pancreatic   ductal dilatation demonstrated. There is subtle peripancreatic stranding.     Gallbladder and bile ducts: No calcified stones or biliary ductal dilatation.     Kidneys: Stable 3.2 cm right renal cyst. No urolithiasis or hydronephrosis.     Adrenal glands: Unremarkable.     GI tract: Colonic diverticulosis without evidence of acute diverticulitis. No   bowel obstruction or focal inflammatory change elsewhere. Normal appendix. Fecal   loading of the proximal colon. No free air or free fluid.     Lymph nodes: No pathologic lymphadenopathy.     Vascular structures: Unremarkable.     Pelvic Organs: Unremarkable.     Bones: No acute or suspicious osseous abnormality.        IMPRESSION:     1. Ill-defined low attenuation in the body of the pancreas has increased and may   represent progression of pancreatitis in the appropriate clinical setting. Other   etiologies are not excluded. Again, follow-up MRI pancreas protocol without and   with contrast may prove useful for further evaluation.     2. Colonic diverticulosis without evidence of  acute diverticulitis.     MR Abdomen W/WO contrast, 08/19/24    1.5 T MRI of the abdomen was performed with pre and postcontrast T1 weighted   imaging; T2 weighted imaging; diffusion weighted imaging; in and out of phase   imaging. 15 mL Clariscan IV.     COMPARISON:   Multiple prior abdominal CTs, most recent 08/18/2024     FINDINGS:   Lungs: The lung bases are clear. No pleural or pericardial effusion. Small   hiatal hernia.     Liver: Homogeneous liver parenchyma. No hepatic masses. Scattered subcentimeter   T2 hyperintensities too small to accurately characterize, but likely benign   cysts or hemangiomas.     Biliary tree and gallbladder: No intra or extrahepatic biliary dilation.   Fluid-filled gallbladder without stones.     Spleen: Unremarkable     Pancreas: There is a focal region within the pancreatic body with T1   hypointensity and T2 hyperintensity relative to the adjacent pancreatic   parenchyma measuring approximately 2 x 3.6 cm 14/34. There is associated mild   diffusion restriction 12/97 and slight arterial phase hypoenhancement 18/34   within this region, which closely approximates the posterior aspect of the   stomach. There is a T2 hyperintense cystic structure measuring 0.6 x 0.8 cm   8/17 within this region, that appears to communicate with the main pancreatic   duct. This is present on prior day CT, although appears more prominent compared   to CT from 08/14/2024 and new compared to CT from 2022, favored to represent a   developing area of necrosis. There is mild peripancreatic stranding adjacent to   this region. No fluid collections are appreciated. No pancreatic duct dilation.     Adrenal glands: Unremarkable.     Kidneys and ureters: No renal masses or hydronephrosis. Simple right renal cyst   measuring 3.2 cm     GI tract: No evidence of obstruction or inflammation. Duodenal diverticulum.   Colonic diverticulosis.     Vasculature: The IVC and aorta are patent. No abdominal aortic aneurysm.  No   splenic artery aneurysm.     Lymph nodes: No lymphadenopathy.     Abdominal wall: Unremarkable     Bones: Degenerative change of the imaged spine.     IMPRESSION:   1. Focal region of signal abnormality within the pancreatic body, similar to   prior day CT, is suggestive of pancreatitis with a suspected small area of   developing necrosis. Please correlate with lipase. Recommend continued imaging   follow up to resolution to exclude underlying mass.     CT PULMONARY ANGIOGRAM AND CT OF THE ABDOMEN AND PELVIS, 08/27/24     HISTORY: Upper abdominal pain. Nausea.     TECHNIQUE: After the intravenous administration of 100 mL of Omnipaque 350,   contiguous axial images were obtained through the chest using a standard   pulmonary embolism protocol (image acquisition was timed for maximum pulmonary   arterial enhancement). Additional axial images were acquired through the abdomen   and pelvis. Coronal and sagittal reconstructions.     CT DLP DOSE: 988 mGy-cm     COMPARISON: CTs of the abdomen and pelvis from 8/18/2024, 8/14/2024, and   10/26/2022.     FINDINGS:     CT PULMONARY ANGIOGRAM:   Pulmonary arteries: Good contrast opacification. No filling defects to suggest   an acute pulmonary embolus.   Heart and aorta: Heart size is within normal limits. No pericardial effusion.   Normal caliber of the thoracic aorta.   Lymphadenopathy: None by size criteria.   Lungs: No evidence of acute airways or airspace disease. No pleural effusion or   pneumothorax.   Bones: No acute abnormality. Postsurgical changes left shoulder.   Other: A small amount of fluid is seen in the nondistended thoracic esophagus.   This is likely related to reflux.     CT OF THE ABDOMEN AND PELVIS:   Liver: Normal size. Small focus of fatty infiltration is seen along the   falciform ligament.   Gallbladder and bile ducts: No calcified stones in the gallbladder or   significant bile duct dilatation.   Spleen: Normal size. Benign calcification.  "  Pancreas: In the body of the pancreas on axial images 43 through 52, there is   now a more well-defined low-attenuation focus (average Hounsfield units between   15 and 20) which on axial image 49 measures 1.7 x 3.9 cm. At a similar level on   the most recent examination, this measured 0.6 x 1.8 cm. No gas is seen within   this collection. There is only minimal associated peripancreatic fat stranding   in this region.   Adrenal glands: No nodule or mass.   Kidneys: Normal size and enhancement. Right renal cyst is redemonstrated.   GI tract: Small hiatal hernia. Stomach is nondistended. No abnormally dilated   small or large bowel to suggest obstruction. No evidence of acute appendicitis.   Moderate to large amount of stool is seen in the colon.   Vessels: Atherosclerotic changes. No abdominal aortic aneurysm. There is focal   dilatation of the proximal celiac axis, best seen on axial images 44 and 45,   with associated minor periarterial edema. On the arterial phase images (from the   chest CT) on axial images 94 and 95, there is irregularity of the right lateral   wall of the celiac artery and this region. This could be related to a   pseudoaneurysm. Also best noted on the arterial phase images, is an abnormal   \"tangle\" of vessels along the anterior aspect of the pancreatic neck and body.   This could be related to an AVM.   Free air and free fluid: None.   Lymphadenopathy: None by size criteria.   Pelvic contents: No appreciable urinary bladder wall thickening. Prostate size   is within normal limits.   Bones: No acute abnormality.   Other: Tiny fat-containing umbilical hernia.     IMPRESSION:   1. No CT evidence of an acute pulmonary embolus.   2.  There has been interval progression in the size of the low-attenuation fluid   collection seen within the pancreatic body, with new mild peripancreatic   inflammatory changes. This is likely related to acute necrotizing pancreatitis   with progression of the acute " necrotic collection.   3.  There has been interval development of an abnormal appearance of the   proximal celiac artery, with a contour abnormality seen along the right lateral   wall. There are mild associated surrounding inflammatory changes in this region.   Given the presence of pancreatitis, this could be related to a celiac artery   pseudoaneurysm. There is also an abnormal collection of vessels seen along the   anterior margin of the pancreatic neck and body, which could be related to an   AVM. Recommend IR consultation.   4.  Other findings as noted above.

## 2024-08-28 NOTE — CONSULTS
Vascular Surgery Consult  2024    Vincent Byers  : 1970    Date of Service: 2024 8:29 AM    Reason for Consult: Pancreatic AVM vs. Celiac artery pseudoaneurysm    Assessment and Plan:  Vincent Byers is a 54 year old male with PMH of pancreatitis of unknown etiology recently diagnosed  who presented with worsening of abdominal pain found to have ongoing pancreatitis, has not had relief for 3 weeks. Vascular surgery consulted given concern for possible celiac artery pseudoaneurysm and AV malformation of pancreas.  On exam, his abdomen is soft, and he has tenderness to palpation. Labs notable for leukocytosis of 14.2. On my review of the CTA patient has what appears to be a small dissection flap of the celiac artery, also has wall thickening of the SMA and renal arteries concerning for possible vasculitis. Does have AV malformation surrounding the pancreas, including abutting the necrotic pancreatic psuedocyst. This is certainly concerning as this could erode into the AVM and lead to bleeding and patient decompensation. Discussed with IR colleagues, general surgery team,  while there is nothing to do from their end currently without b    - Recommend transfer to outside facility where there are advanced pancreatic surgeons who work on necrotizing pancreatitis in case of patient decompensatoin   -Recommend heme/onc consult (able to get inpt) for workup of vasculitis (consult placed)     Discussed with Dr. Lee, who is in agreement with the above    Ajit Hammer MD  Surgery PGY4    History of Present Illness:    Vincent Byers is a  54 year old male with PMH of pancreatitis of unknown etiology recently diagnosed  who presented with worsening of abdominal pain found to have ongoing pancreatitis, has not had relief for 3 weeks. Vascular surgery consulted given concern for possible celiac artery pseudoaneurysm and AV malformation of pancreas.    He has been in constant pain  for 3 weeks with little relief. Does not drink alcohol TG not elevated. No gallstone pancreatitis, currently etiology is unknown.       Past Medical History:  No past medical history on file.    Past Surgical History  None    Family History:  No known family history of AVM      Medications:  No current outpatient medications on file.       Allergies:   No Known Allergies    Review of Symptoms:  A 10 point review of symptoms has been conducted and is negative except for that mentioned in the above HPI.    Physical Exam:    Blood pressure (!) 173/100, pulse 69, temperature 97.6  F (36.4  C), temperature source Oral, resp. rate 15, SpO2 96%.  Gen:    Lying in bed in NAD, A&OX3  HEENT: Normocephalic and atraumatic  CV:  RRR  Pulm:  Non-labored breathing on room air  Abd:  Soft, tender to palpation mid abdomen, non-distended  Ext:  Warm and well perfused, no obvious deformities  Vascular:  Palpable DP pulses b/l, palpable     Labs:  CBC RESULTS:   Recent Labs   Lab Test 08/28/24  0600   WBC 14.2*   HGB 13.2*        Last Basic Metabolic Panel:  Lab Results   Component Value Date    POTASSIUM 4.1 08/28/2024     Lab Results   Component Value Date    CR 0.98 08/28/2024       Imaging:  Short segment dissection celiac artery proximal to take off of major branches.   AV malformation appearing to surround pancreatic cyst.

## 2024-10-17 ENCOUNTER — MEDICAL CORRESPONDENCE (OUTPATIENT)
Dept: HEALTH INFORMATION MANAGEMENT | Facility: CLINIC | Age: 54
End: 2024-10-17
Payer: COMMERCIAL

## 2024-10-18 ENCOUNTER — TRANSCRIBE ORDERS (OUTPATIENT)
Dept: OTHER | Age: 54
End: 2024-10-18

## 2024-10-18 DIAGNOSIS — I72.8 CELIAC ARTERY ANEURYSM (H): Primary | ICD-10-CM

## 2024-10-18 DIAGNOSIS — K86.3 PANCREATIC PSEUDOCYST: ICD-10-CM

## 2024-10-18 DIAGNOSIS — K86.1 INTERSTITIAL PANCREATITIS (H): ICD-10-CM
